# Patient Record
Sex: FEMALE | URBAN - METROPOLITAN AREA
[De-identification: names, ages, dates, MRNs, and addresses within clinical notes are randomized per-mention and may not be internally consistent; named-entity substitution may affect disease eponyms.]

---

## 2023-06-14 ENCOUNTER — TELEPHONE ENCOUNTER (OUTPATIENT)
Dept: URBAN - METROPOLITAN AREA CLINIC 113 | Facility: CLINIC | Age: 23
End: 2023-06-14

## 2023-08-10 ENCOUNTER — OFFICE VISIT (OUTPATIENT)
Dept: URBAN - METROPOLITAN AREA CLINIC 113 | Facility: CLINIC | Age: 23
End: 2023-08-10
Payer: COMMERCIAL

## 2023-08-10 ENCOUNTER — DASHBOARD ENCOUNTERS (OUTPATIENT)
Age: 23
End: 2023-08-10

## 2023-08-10 VITALS
HEIGHT: 67 IN | SYSTOLIC BLOOD PRESSURE: 115 MMHG | BODY MASS INDEX: 38.92 KG/M2 | DIASTOLIC BLOOD PRESSURE: 90 MMHG | HEART RATE: 83 BPM | RESPIRATION RATE: 14 BRPM | WEIGHT: 248 LBS | TEMPERATURE: 97.3 F

## 2023-08-10 DIAGNOSIS — K62.5 RECTAL BLEEDING: ICD-10-CM

## 2023-08-10 DIAGNOSIS — K58.2 MIXED IRRITABLE BOWEL SYNDROME: ICD-10-CM

## 2023-08-10 DIAGNOSIS — E73.9 LACTOSE INTOLERANCE: ICD-10-CM

## 2023-08-10 DIAGNOSIS — K21.9 GASTROESOPHAGEAL REFLUX DISEASE, UNSPECIFIED WHETHER ESOPHAGITIS PRESENT: ICD-10-CM

## 2023-08-10 PROBLEM — 235595009: Status: ACTIVE | Noted: 2023-08-10

## 2023-08-10 PROBLEM — 440544005: Status: ACTIVE | Noted: 2023-08-10

## 2023-08-10 PROBLEM — 782415009: Status: ACTIVE | Noted: 2023-08-10

## 2023-08-10 PROCEDURE — 99204 OFFICE O/P NEW MOD 45 MIN: CPT | Performed by: STUDENT IN AN ORGANIZED HEALTH CARE EDUCATION/TRAINING PROGRAM

## 2023-08-10 RX ORDER — SEMAGLUTIDE 1 MG/.5ML
0.5 ML INJECTION, SOLUTION SUBCUTANEOUS
Status: ACTIVE | COMMUNITY

## 2023-08-10 RX ORDER — OMEPRAZOLE 40 MG/1
1 CAPSULE 30 MINUTES BEFORE MORNING MEAL CAPSULE, DELAYED RELEASE ORAL ONCE A DAY
Qty: 90 | Refills: 0 | OUTPATIENT
Start: 2023-08-10

## 2023-08-10 RX ORDER — ESCITALOPRAM OXALATE 10 MG/1
1 TABLET TABLET, FILM COATED ORAL ONCE A DAY
Status: ACTIVE | COMMUNITY

## 2023-08-10 RX ORDER — METHYLPHENIDATE HYDROCHLORIDE 10 MG/1
1 TABLET ON EMPTY STOMACH TABLET ORAL TWICE A DAY
Status: ACTIVE | COMMUNITY

## 2023-08-10 RX ORDER — IBUPROFEN 800 MG/1
1 TABLET WITH FOOD OR MILK AS NEEDED TABLET, FILM COATED ORAL
Status: ACTIVE | COMMUNITY

## 2023-08-10 NOTE — HPI-TODAY'S VISIT:
Initial visit 8/10/2023; PCP Dr. Андрей Jiménez Ms. Trujillo  is a 23-year-old female with a past medical history significant for anxiety, obesity.  She is being referred for history of chronic diarrhea associated with some rectal bleeding.   She had a recent ER visit at [facility] on 8/5/2023 for dental abscess.  She was discharged with a course of penicillin antibiotics. She describes "digestive issues "since 2017.  Her symptoms include abdominal bloating and cramping after certain meals associated with both diarrhea and constipation. She does note exacerbation of abdominal bloating and cramping with the use of lactose and so avoids dairy. More recently she developed small-volume rectal bleeding that she noted within the stool.  She also complains of acid reflux, mainly exacerbated by tomato based foods.  She has never had any endoscopic evaluation, she has not tried any therapy for acid reflux.  She is losing weight intentionally with the use of Wegovy and phentermine, she is lost approximately 30 pounds over the last few months.  She has no family history of colon cancer.

## 2023-08-28 ENCOUNTER — TELEPHONE ENCOUNTER (OUTPATIENT)
Dept: URBAN - METROPOLITAN AREA CLINIC 113 | Facility: CLINIC | Age: 23
End: 2023-08-28

## 2023-09-01 ENCOUNTER — OFFICE VISIT (OUTPATIENT)
Dept: URBAN - METROPOLITAN AREA SURGERY CENTER 25 | Facility: SURGERY CENTER | Age: 23
End: 2023-09-01